# Patient Record
Sex: FEMALE | Race: WHITE | NOT HISPANIC OR LATINO
[De-identification: names, ages, dates, MRNs, and addresses within clinical notes are randomized per-mention and may not be internally consistent; named-entity substitution may affect disease eponyms.]

---

## 2018-09-24 PROBLEM — Z00.00 ENCOUNTER FOR PREVENTIVE HEALTH EXAMINATION: Status: ACTIVE | Noted: 2018-09-24

## 2018-10-25 ENCOUNTER — LABORATORY RESULT (OUTPATIENT)
Age: 48
End: 2018-10-25

## 2018-10-26 ENCOUNTER — APPOINTMENT (OUTPATIENT)
Dept: UROLOGY | Facility: CLINIC | Age: 48
End: 2018-10-26
Payer: COMMERCIAL

## 2018-10-26 ENCOUNTER — OUTPATIENT (OUTPATIENT)
Dept: OUTPATIENT SERVICES | Facility: HOSPITAL | Age: 48
LOS: 1 days | Discharge: HOME | End: 2018-10-26

## 2018-10-26 VITALS
DIASTOLIC BLOOD PRESSURE: 69 MMHG | SYSTOLIC BLOOD PRESSURE: 116 MMHG | BODY MASS INDEX: 41.68 KG/M2 | HEART RATE: 106 BPM | HEIGHT: 68 IN | WEIGHT: 275 LBS

## 2018-10-26 DIAGNOSIS — R35.0 FREQUENCY OF MICTURITION: ICD-10-CM

## 2018-10-26 DIAGNOSIS — R35.1 NOCTURIA: ICD-10-CM

## 2018-10-26 DIAGNOSIS — Z78.9 OTHER SPECIFIED HEALTH STATUS: ICD-10-CM

## 2018-10-26 DIAGNOSIS — N20.0 CALCULUS OF KIDNEY: ICD-10-CM

## 2018-10-26 PROCEDURE — 99204 OFFICE O/P NEW MOD 45 MIN: CPT

## 2018-10-29 LAB
APPEARANCE: ABNORMAL
BACTERIA UR CULT: NORMAL
BILIRUBIN URINE: NEGATIVE
BLOOD URINE: NEGATIVE
COLOR: YELLOW
GLUCOSE QUALITATIVE U: NEGATIVE MG/DL
KETONES URINE: NEGATIVE
LEUKOCYTE ESTERASE URINE: ABNORMAL
NITRITE URINE: NEGATIVE
PH URINE: 6.5
PROTEIN URINE: NEGATIVE MG/DL
SPECIFIC GRAVITY URINE: 1.02
UROBILINOGEN URINE: 0.2 MG/DL (ref 0.2–?)

## 2018-10-30 ENCOUNTER — OTHER (OUTPATIENT)
Age: 48
End: 2018-10-30

## 2018-11-01 ENCOUNTER — OUTPATIENT (OUTPATIENT)
Dept: OUTPATIENT SERVICES | Facility: HOSPITAL | Age: 48
LOS: 1 days | Discharge: HOME | End: 2018-11-01

## 2018-11-01 DIAGNOSIS — N20.0 CALCULUS OF KIDNEY: ICD-10-CM

## 2018-11-02 ENCOUNTER — APPOINTMENT (OUTPATIENT)
Dept: UROLOGY | Facility: CLINIC | Age: 48
End: 2018-11-02
Payer: COMMERCIAL

## 2018-11-02 ENCOUNTER — OUTPATIENT (OUTPATIENT)
Dept: OUTPATIENT SERVICES | Facility: HOSPITAL | Age: 48
LOS: 1 days | Discharge: HOME | End: 2018-11-02

## 2018-11-02 VITALS
HEART RATE: 103 BPM | WEIGHT: 275 LBS | SYSTOLIC BLOOD PRESSURE: 138 MMHG | BODY MASS INDEX: 41.68 KG/M2 | HEIGHT: 68 IN | DIASTOLIC BLOOD PRESSURE: 83 MMHG

## 2018-11-02 DIAGNOSIS — N20.0 CALCULUS OF KIDNEY: ICD-10-CM

## 2018-11-02 PROCEDURE — 99214 OFFICE O/P EST MOD 30 MIN: CPT

## 2018-11-05 LAB
APPEARANCE: CLEAR
BACTERIA UR CULT: NORMAL
BILIRUBIN URINE: NEGATIVE
BLOOD URINE: NEGATIVE
COLOR: YELLOW
GLUCOSE QUALITATIVE U: NEGATIVE MG/DL
KETONES URINE: NEGATIVE
LEUKOCYTE ESTERASE URINE: NEGATIVE
NITRITE URINE: NEGATIVE
PH URINE: 7
PROTEIN URINE: NEGATIVE MG/DL
SPECIFIC GRAVITY URINE: 1.01
UROBILINOGEN URINE: 0.2 MG/DL (ref 0.2–?)

## 2018-11-07 ENCOUNTER — OUTPATIENT (OUTPATIENT)
Dept: OUTPATIENT SERVICES | Facility: HOSPITAL | Age: 48
LOS: 1 days | Discharge: HOME | End: 2018-11-07

## 2018-11-07 ENCOUNTER — APPOINTMENT (OUTPATIENT)
Dept: UROLOGY | Facility: HOSPITAL | Age: 48
End: 2018-11-07
Payer: COMMERCIAL

## 2018-11-07 VITALS
OXYGEN SATURATION: 98 % | WEIGHT: 274.92 LBS | HEIGHT: 68 IN | TEMPERATURE: 98 F | HEART RATE: 80 BPM | DIASTOLIC BLOOD PRESSURE: 58 MMHG | SYSTOLIC BLOOD PRESSURE: 121 MMHG | RESPIRATION RATE: 18 BRPM

## 2018-11-07 VITALS — RESPIRATION RATE: 18 BRPM | SYSTOLIC BLOOD PRESSURE: 126 MMHG | HEART RATE: 85 BPM | DIASTOLIC BLOOD PRESSURE: 66 MMHG

## 2018-11-07 DIAGNOSIS — Z90.49 ACQUIRED ABSENCE OF OTHER SPECIFIED PARTS OF DIGESTIVE TRACT: Chronic | ICD-10-CM

## 2018-11-07 DIAGNOSIS — Z87.442 PERSONAL HISTORY OF URINARY CALCULI: Chronic | ICD-10-CM

## 2018-11-07 PROCEDURE — 52353 CYSTOURETERO W/LITHOTRIPSY: CPT | Mod: LT

## 2018-11-07 RX ORDER — PHENAZOPYRIDINE HCL 100 MG
200 TABLET ORAL ONCE
Qty: 0 | Refills: 0 | Status: COMPLETED | OUTPATIENT
Start: 2018-11-07 | End: 2018-11-07

## 2018-11-07 RX ORDER — ONDANSETRON 8 MG/1
4 TABLET, FILM COATED ORAL ONCE
Qty: 0 | Refills: 0 | Status: DISCONTINUED | OUTPATIENT
Start: 2018-11-07 | End: 2018-11-22

## 2018-11-07 RX ORDER — MORPHINE SULFATE 50 MG/1
2 CAPSULE, EXTENDED RELEASE ORAL
Qty: 0 | Refills: 0 | Status: DISCONTINUED | OUTPATIENT
Start: 2018-11-07 | End: 2018-11-07

## 2018-11-07 RX ORDER — OXYCODONE AND ACETAMINOPHEN 5; 325 MG/1; MG/1
1 TABLET ORAL ONCE
Qty: 0 | Refills: 0 | Status: DISCONTINUED | OUTPATIENT
Start: 2018-11-07 | End: 2018-11-07

## 2018-11-07 RX ORDER — SODIUM CHLORIDE 9 MG/ML
1000 INJECTION, SOLUTION INTRAVENOUS
Qty: 0 | Refills: 0 | Status: DISCONTINUED | OUTPATIENT
Start: 2018-11-07 | End: 2018-11-22

## 2018-11-07 RX ORDER — OXYCODONE 5 MG/1
5 TABLET ORAL
Qty: 10 | Refills: 0 | Status: ACTIVE | COMMUNITY
Start: 2018-11-07 | End: 1900-01-01

## 2018-11-07 RX ORDER — HYDROMORPHONE HYDROCHLORIDE 2 MG/ML
0.5 INJECTION INTRAMUSCULAR; INTRAVENOUS; SUBCUTANEOUS
Qty: 0 | Refills: 0 | Status: DISCONTINUED | OUTPATIENT
Start: 2018-11-07 | End: 2018-11-07

## 2018-11-07 RX ADMIN — SODIUM CHLORIDE 100 MILLILITER(S): 9 INJECTION, SOLUTION INTRAVENOUS at 16:13

## 2018-11-07 RX ADMIN — Medication 200 MILLIGRAM(S): at 16:28

## 2018-11-07 NOTE — BRIEF OPERATIVE NOTE - OPERATION/FINDINGS
left impacted distal ureteral stone successfully fragmented w laser and stones extracted sent for stone analysis. f/u in 5 days for stent removal

## 2018-11-07 NOTE — ASU DISCHARGE PLAN (ADULT/PEDIATRIC). - SPECIAL INSTRUCTIONS
You had a ureteral stent placed in your ureter to help keep the ureter open post operatively. It is attached to a string which is taped to your thigh. Please do not remove it and be cautious when wiping. We will prescribe flomax electronically to your pharmacy which you should take daily for stent discomfort. You can take ibuprofen (advil/motrin) and add tylenol as needed for pain control.   Please complete the antibiotic course which was also prescribed to your pharmacy. Save one antibiotic for the day of stent removal.  's office will call you for a follow up appointment. You had a ureteral stent placed in your ureter to help keep the ureter open post operatively.  We will prescribe flomax electronically to your pharmacy which you should take daily for stent discomfort. You can take ibuprofen (advil/motrin) and add tylenol as needed for pain control.   Please complete the antibiotic course which was also prescribed to your pharmacy. Save one antibiotic for the day of stent removal.  's office will call you for a follow up appointment.

## 2018-11-07 NOTE — PACU DISCHARGE NOTE - NS MD DISCHARGE NOTE DISCHARGE
History  Chief Complaint   Patient presents with    Syncope     pt presents post syncopal episode before 10 pm, "i slamed the jeeep door and i blacked out" - thinners, laceration ot forehead     Head Injury     44-year-old female presents here with a chief complaint of accidentally having slammed the trunk door into her face  Point of impact was over the bridge of the nose where there is a punctate laceration that will not need repair  She did have a positive loss of consciousness less than a minute  Currently she is symptom free aside from pain over the bridge of her nose  Additionally, she states that she has a current dental abscess over the left lower jaw and is requesting antibiotic therapy for this            Prior to Admission Medications   Prescriptions Last Dose Informant Patient Reported? Taking? ALPRAZolam (XANAX) 0 5 mg tablet   Yes No   Sig: Take 0 5 mg by mouth as needed for anxiety   pregabalin (LYRICA) 75 mg capsule   No No   Sig: Take 1 capsule by mouth 2 (two) times a day for 10 days   valACYclovir (VALTREX) 1,000 mg tablet   No No   Sig: Take 1 tablet by mouth every 8 (eight) hours for 5 days      Facility-Administered Medications: None       Past Medical History:   Diagnosis Date    Atrial septal defect     Mitral valve prolapse        Past Surgical History:   Procedure Laterality Date    APPENDECTOMY       SECTION      CHOLECYSTECTOMY      HYSTERECTOMY      SIGMOID RESECTION / RECTOPEXY         Family History   Problem Relation Age of Onset    Adopted: Yes    No Known Problems Mother     No Known Problems Father      I have reviewed and agree with the history as documented  Social History   Substance Use Topics    Smoking status: Current Every Day Smoker     Packs/day: 0 50     Types: Cigarettes    Smokeless tobacco: Never Used    Alcohol use No        Review of Systems   Constitutional: Negative for activity change, fatigue and fever     HENT: Negative for Home congestion, ear pain, rhinorrhea and sore throat  Eyes: Negative  Respiratory: Negative for cough, shortness of breath and wheezing  Gastrointestinal: Negative for abdominal pain, diarrhea, nausea and vomiting  Endocrine: Negative  Genitourinary: Negative for difficulty urinating, dyspareunia, dysuria, flank pain, frequency, menstrual problem, pelvic pain, urgency, vaginal bleeding, vaginal discharge and vaginal pain  Musculoskeletal: Negative for arthralgias and myalgias  Skin: Positive for wound  Negative for color change and pallor  Neurological: Negative for dizziness, speech difficulty, weakness and headaches  Hematological: Negative for adenopathy  Psychiatric/Behavioral: Negative for confusion  Physical Exam  ED Triage Vitals   Temperature Pulse Respirations Blood Pressure SpO2   05/07/18 2250 05/07/18 2247 05/07/18 2247 05/07/18 2247 05/07/18 2247   99 3 °F (37 4 °C) 96 18 149/67 99 %      Temp Source Heart Rate Source Patient Position - Orthostatic VS BP Location FiO2 (%)   05/07/18 2250 05/07/18 2247 05/07/18 2247 05/07/18 2247 --   Oral Monitor Sitting Right arm       Pain Score       05/07/18 2247       6           Orthostatic Vital Signs  Vitals:    05/07/18 2247 05/08/18 0030   BP: 149/67 104/70   Pulse: 96 89   Patient Position - Orthostatic VS: Sitting Sitting       Physical Exam   Constitutional: She is oriented to person, place, and time  She appears well-developed and well-nourished  She is cooperative  Non-toxic appearance  She does not have a sickly appearance  She does not appear ill  No distress  HENT:   Head: Normocephalic and atraumatic  Right Ear: Tympanic membrane and external ear normal    Left Ear: Tympanic membrane and external ear normal    Nose: No rhinorrhea or sinus tenderness  No epistaxis  Right sinus exhibits no maxillary sinus tenderness and no frontal sinus tenderness   Left sinus exhibits no maxillary sinus tenderness and no frontal sinus tenderness  Mouth/Throat: Oropharynx is clear and moist and mucous membranes are normal  Normal dentition  Eyes: EOM are normal  Pupils are equal, round, and reactive to light  Neck: Normal range of motion  Neck supple  Cardiovascular: Normal rate, regular rhythm and normal heart sounds  No murmur heard  Pulmonary/Chest: Effort normal and breath sounds normal  No accessory muscle usage  No respiratory distress  She has no wheezes  She has no rales  She exhibits no tenderness  Abdominal: Soft  She exhibits no distension  There is no guarding  Musculoskeletal: Normal range of motion  She exhibits no edema or tenderness  Lymphadenopathy:     She has no cervical adenopathy  Neurological: She is alert and oriented to person, place, and time  She exhibits normal muscle tone  Skin: Skin is warm and dry  No rash noted  No erythema  Less than 1 cm punctate laceration over the bridge of the nose  Psychiatric: She has a normal mood and affect  Nursing note and vitals reviewed  ED Medications  Medications   HYDROcodone-acetaminophen (NORCO) 5-325 mg per tablet 1 tablet (1 tablet Oral Given 5/7/18 4047)   bacitracin topical ointment 1 large application (1 large application Topical Given 5/7/18 3731)       Diagnostic Studies  Results Reviewed     None                 CT cervical spine without contrast   Final Result by Chhaya Dash MD (05/08 7142)      No cervical spine fracture or traumatic malalignment  There is reversal of normal cervical lordosis  This may simply be due to prominent flexion  Grade 1 retrolisthesis C4 on C5  Mild multilevel cervical degenerative disease  Severe neuroforaminal narrowing on the right at C5-6 and to a lesser extent bilaterally at C4-5 right greater than left        Findings are consistent with the preliminary report from [facility] which was provided shortly after completion of the exam                          Workstation performed: [id number]         CT head without contrast   Final Result by Leilani Zamora MD (05/08 0006)      No acute intracranial abnormality  Workstation performed: [id number]                    Procedures  Procedures       Phone Contacts  ED Phone Contact    ED Course                               MDM  Number of Diagnoses or Management Options  Closed head injury, initial encounter: new and requires workup  Concussion with loss of consciousness of 30 minutes or less, initial encounter: new and requires workup  Dental abscess: new and requires workup     Amount and/or Complexity of Data Reviewed  Tests in the radiology section of CPT®: reviewed and ordered  Independent visualization of images, tracings, or specimens: yes    Patient Progress  Patient progress: stable    CritCare Time    Disposition  Final diagnoses:   Closed head injury, initial encounter   Concussion with loss of consciousness of 30 minutes or less, initial encounter   Dental abscess     Time reflects when diagnosis was documented in both MDM as applicable and the Disposition within this note     Time User Action Codes Description Comment    5/8/2018 12:17 AM Rikki Vacaan Add [S09 90XA] Closed head injury, initial encounter     5/8/2018 12:18 AM Brandt Chung Add [S06 0X1A] Concussion with loss of consciousness of 30 minutes or less, initial encounter     5/8/2018 12:18 AM Rikki Vacaan Add [K04 7] Dental abscess       ED Disposition     ED Disposition Condition Comment    Discharge  [address] [place] [address] discharge to home/self care      Condition at discharge: Good        Follow-up Information     Follow up With Specialties Details Why Contact Info Additional Information    [address] [facility] Emergency Department Emergency Medicine  As needed [address] [id number] ED, [address] [place], [place], [facility]        Discharge Medication List as of 5/8/2018 12:19 AM START taking these medications    Details   clindamycin (CLEOCIN) 300 MG capsule Take 1 capsule (300 mg total) by mouth 4 (four) times a day for 10 days, Starting Tue 5/8/2018, Until Fri 5/18/2018, Print      HYDROcodone-acetaminophen (NORCO) 5-325 mg per tablet Take 1 tablet by mouth every 6 (six) hours as needed (Not relieved by Anti-inflammatory) for up to 12 doses Max Daily Amount: 4 tablets, Starting Tue 5/8/2018, Print         CONTINUE these medications which have NOT CHANGED    Details   ALPRAZolam (XANAX) 0 5 mg tablet Take 0 5 mg by mouth as needed for anxiety, Until Discontinued, Historical Med      pregabalin (LYRICA) 75 mg capsule Take 1 capsule by mouth 2 (two) times a day for 10 days, Starting 10/20/2016, Until Sun 10/30/16, Print      valACYclovir (VALTREX) 1,000 mg tablet Take 1 tablet by mouth every 8 (eight) hours for 5 days, Starting 10/20/2016, Until Tue 10/25/16, Print           No discharge procedures on file      ED Provider  Electronically Signed by           Wendie Larsen [place]  05/09/18 1683

## 2018-11-07 NOTE — BRIEF OPERATIVE NOTE - PROCEDURE
<<-----Click on this checkbox to enter Procedure Ureteroscopy of left ureter with lithotripsy  11/07/2018  left ureteral stent insertion  [facility]

## 2018-11-09 LAB — COMPN STONE: SIGNIFICANT CHANGE UP

## 2018-11-12 DIAGNOSIS — N20.1 CALCULUS OF URETER: ICD-10-CM

## 2018-11-13 ENCOUNTER — APPOINTMENT (OUTPATIENT)
Dept: UROLOGY | Facility: CLINIC | Age: 48
End: 2018-11-13
Payer: COMMERCIAL

## 2018-11-13 PROBLEM — Z86.39 PERSONAL HISTORY OF OTHER ENDOCRINE, NUTRITIONAL AND METABOLIC DISEASE: Chronic | Status: ACTIVE | Noted: 2018-11-07

## 2018-11-13 PROBLEM — Z86.79 PERSONAL HISTORY OF OTHER DISEASES OF THE CIRCULATORY SYSTEM: Chronic | Status: ACTIVE | Noted: 2018-11-07

## 2018-11-13 PROCEDURE — 52000 CYSTOURETHROSCOPY: CPT

## 2019-02-08 ENCOUNTER — APPOINTMENT (OUTPATIENT)
Dept: UROLOGY | Facility: CLINIC | Age: 49
End: 2019-02-08
Payer: COMMERCIAL

## 2019-02-08 VITALS — HEIGHT: 68 IN | WEIGHT: 275 LBS | BODY MASS INDEX: 41.68 KG/M2

## 2019-02-08 PROCEDURE — 99214 OFFICE O/P EST MOD 30 MIN: CPT

## 2019-02-08 RX ORDER — ALLOPURINOL 100 MG/1
100 TABLET ORAL DAILY
Qty: 60 | Refills: 3 | Status: ACTIVE | COMMUNITY
Start: 2019-02-08 | End: 1900-01-01

## 2019-02-08 NOTE — ASSESSMENT
[FreeTextEntry1] : MAHENDRA HERNANDEZ is a 49 year old female who presents Status post ureteroscopy and laser lithotripsy of an obstructing distal ureteral stone November 7, 2018 did well after surgery and her stent was removed. She has a long history of nephrolithiasis and presents today after 24-hour metabolic workup.\par Ltholink reveals extreme hypercalciuria, severe hyperuricosuria and a low urine pH is 5.41.\par \par Will prescribe allopurinol As she does not he eat animal protein. I recommended the patient stop w her multivitamin tablets, vitamin D and calcium supplementation. As well as vitamin C.\par Hopefully this will improve her hypercalciuria and now we will need to initiate thiazides treatment.In the future reconsider calcium citrate as a supplementation\par \par Followup next visit with a 24-hour metabolic workup as well as serum parathyroid and uric acid level and a KUB\par

## 2019-02-08 NOTE — HISTORY OF PRESENT ILLNESS
[FreeTextEntry1] : MAHENDRA HERNANDEZ is a 49 year old female who presents Status post ureteroscopy and laser lithotripsy of an obstructing distal ureteral stone November 7, 2018 did well after surgery and her stent was removed. She has a long history of nephrolithiasis and presents today after 24-hour metabolic workup.\par Ltholink reveals extreme hypercalciuria, severe hyperuricosuria and a low urine pH is 5.41.\par Patient's prior stone analysis is predominantly calcium oxylate.\par Records reviewed and prior labs show high normal calcium with normal liver function tests.\par She currently denies any flank pain.\par Denies gross hematuria, dysuria or associated symptoms. \par  [Currently Experiencing ___] :  [unfilled] [1] : 1 [Lower Back] : lower back [None] : There is no radiation [Sharp] : sharp [Sudden] : sudden [Improving] : improving [de-identified] : Inspiration/movement [de-identified] : Rest

## 2019-06-06 ENCOUNTER — APPOINTMENT (OUTPATIENT)
Dept: UROLOGY | Facility: CLINIC | Age: 49
End: 2019-06-06
Payer: COMMERCIAL

## 2019-06-06 VITALS
WEIGHT: 275 LBS | BODY MASS INDEX: 41.68 KG/M2 | HEART RATE: 90 BPM | SYSTOLIC BLOOD PRESSURE: 139 MMHG | DIASTOLIC BLOOD PRESSURE: 95 MMHG | HEIGHT: 68 IN

## 2019-06-06 PROCEDURE — 99214 OFFICE O/P EST MOD 30 MIN: CPT

## 2019-06-06 RX ORDER — CHLORTHALIDONE 25 MG/1
25 TABLET ORAL DAILY
Qty: 90 | Refills: 3 | Status: ACTIVE | COMMUNITY
Start: 2019-06-06 | End: 1900-01-01

## 2019-06-06 NOTE — PHYSICAL EXAM
[General Appearance - Well Developed] : well developed [General Appearance - Well Nourished] : well nourished [General Appearance - In No Acute Distress] : no acute distress [Well Groomed] : well groomed [Normal Appearance] : normal appearance [Abdomen Tenderness] : non-tender [Abdomen Soft] : soft [Costovertebral Angle Tenderness] : no ~M costovertebral angle tenderness [Edema] : no peripheral edema [Respiration, Rhythm And Depth] : normal respiratory rhythm and effort [Exaggerated Use Of Accessory Muscles For Inspiration] : no accessory muscle use [] : no respiratory distress [Affect] : the affect was normal [Oriented To Time, Place, And Person] : oriented to person, place, and time [Not Anxious] : not anxious [Mood] : the mood was normal [Normal Station and Gait] : the gait and station were normal for the patient's age

## 2019-06-06 NOTE — ASSESSMENT
[FreeTextEntry1] : MAHENDRA HERNANDEZ is a 49 year old female who presents Status post ureteroscopy and laser lithotripsy of an obstructing distal ureteral stone November 7, 2018 did well after surgery and her stent was removed. She has a long history of nephrolithiasis and presents today after repeat 24-hour metabolic workup.\par \par \par Patient's prior stone analysis is predominantly calcium oxylate.Repeat 24 metabolic workup demonstrates significant hypercalciuria, improved but still elevated hyperuricosuria, low urine volume, increased sodium.\par We will be prescribing chlorthalidone as dietary changes have not improved calcium. The recommending decreasing salt intake. Decreasing a low protein and increasing productive vegetables. Recommend increasing to 3 L of fluid per day.\par Follow up in the future with renal bladder ultrasound and KUB.\par We discussed side effects of hctz meds, electrolyte serum abrnormalities. fu w BMP\par If  future hyperuricosuria can add allopurinol.\par

## 2019-06-06 NOTE — HISTORY OF PRESENT ILLNESS
[FreeTextEntry1] : MAHENDRA HERNANDEZ is a 49 year old female who presents Status post ureteroscopy and laser lithotripsy of an obstructing distal ureteral stone November 7, 2018 did well after surgery and her stent was removed. She has a long history of nephrolithiasis and presents today after repeat 24-hour metabolic workup.\par \par Patient's prior stone analysis is predominantly calcium oxylate.A repeat 24-hour metabolic workup shows low urine volume. Her urine calcium remains significantly elevated despite stopping her supplements. Urine pH remains low.\par Urinary gas excretion has fallen however is moderately elevated .\par Urine citrate is normal. High dietary sodium.\par Her serum uric acid, serum calcium and serum parathyroid and normal limits. Pt did not start allopurinol.\par \par Prior Ltholink reveals extreme hypercalciuria, severe hyperuricosuria and a low urine pH is 5.41.\par Records reviewed and prior labs show high normal calcium with normal liver function tests.\par She currently denies any flank pain.\par Denies gross hematuria, dysuria or associated symptoms. \par  [Currently Experiencing ___] :  [unfilled] [1] : 1 [Lower Back] : lower back [None] : There is no radiation [Sharp] : sharp [Sudden] : sudden [Improving] : improving [de-identified] : Inspiration/movement [de-identified] : Rest

## 2019-11-18 ENCOUNTER — APPOINTMENT (OUTPATIENT)
Dept: UROLOGY | Facility: CLINIC | Age: 49
End: 2019-11-18
Payer: COMMERCIAL

## 2019-11-18 PROCEDURE — 99214 OFFICE O/P EST MOD 30 MIN: CPT

## 2019-11-18 NOTE — HISTORY OF PRESENT ILLNESS
[FreeTextEntry1] : MAHENDRA HERNANDEZ is a 49 year old female who presents Status post ureteroscopy and laser lithotripsy of an obstructing distal ureteral stone November 7, 2018 did well after surgery and her stent was removed. She has a long history of nephrolithiasis and presents today after repeat 24-hour metabolic workup. and kub/sono.\par \par KUB neg for stones\par Sono-\par Left kidney 13 cm and there are 2 echogenic foci in the left mid kidney measuring 3 mm and 5 mm. Possibly these are small stones. These do not shadow. \par \par Litholink 10/2019 (third) demonstrates hypercalciuria again, dec urine vol borderline 1.75 and inc Na in urine. Similar to previous. Borderline hyperuricosuria. She started chlorthalidone last visit he reports she developed significant cramping of her LE. this med was stopped. \par \par Prev-\par Patient's prior stone analysis is predominantly calcium oxylate.\par Her serum uric acid, serum calcium and serum parathyroid and normal limits.\par \par Prior Ltholink reveals extreme hypercalciuria, severe hyperuricosuria and a low urine pH is 5.41.\par Records reviewed and prior labs show high normal calcium with normal liver function tests.\par She currently denies any flank pain.\par Denies gross hematuria, dysuria or associated symptoms. \par  [Currently Experiencing ___] :  [unfilled] [1] : 1 [Lower Back] : lower back [Sharp] : sharp [Sudden] : sudden [None] : There is no radiation [de-identified] : Inspiration/movement [de-identified] : Rest [Improving] : improving

## 2019-11-18 NOTE — ASSESSMENT
[FreeTextEntry1] : MAHENDRA HERNANDEZ is a 49 year old female who presents Status post ureteroscopy and laser lithotripsy of an obstructing distal ureteral stone November 7, 2018 did well after surgery and her stent was removed. She has a long history of nephrolithiasis and presents today after repeat 24-hour metabolic workup. and kub/sono.\par No stones on KUB\par \par Litholink 10/2019 (third) demonstrates hypercalciuria again, dec urine vol borderline 1.75 and inc Na in urine. Similar to previous. Borderline hyperuricosuria. \par \par Recommending decreasing salt intake. Recommend increasing to 3 L of fluid per day.\par Fu w renal re alternative to chlorthalidone\par BMP

## 2019-11-18 NOTE — PHYSICAL EXAM
[General Appearance - Well Developed] : well developed [General Appearance - Well Nourished] : well nourished [Well Groomed] : well groomed [Normal Appearance] : normal appearance [Abdomen Soft] : soft [Abdomen Tenderness] : non-tender [General Appearance - In No Acute Distress] : no acute distress [Costovertebral Angle Tenderness] : no ~M costovertebral angle tenderness [Edema] : no peripheral edema [Respiration, Rhythm And Depth] : normal respiratory rhythm and effort [] : no respiratory distress [Exaggerated Use Of Accessory Muscles For Inspiration] : no accessory muscle use [Oriented To Time, Place, And Person] : oriented to person, place, and time [Affect] : the affect was normal [Not Anxious] : not anxious [Mood] : the mood was normal [Normal Station and Gait] : the gait and station were normal for the patient's age

## 2020-10-14 ENCOUNTER — OUTPATIENT (OUTPATIENT)
Dept: OUTPATIENT SERVICES | Facility: HOSPITAL | Age: 50
LOS: 1 days | Discharge: HOME | End: 2020-10-14
Payer: COMMERCIAL

## 2020-10-14 DIAGNOSIS — Z87.442 PERSONAL HISTORY OF URINARY CALCULI: Chronic | ICD-10-CM

## 2020-10-14 DIAGNOSIS — Z90.49 ACQUIRED ABSENCE OF OTHER SPECIFIED PARTS OF DIGESTIVE TRACT: Chronic | ICD-10-CM

## 2020-10-14 DIAGNOSIS — R82.993 HYPERURICOSURIA: ICD-10-CM

## 2020-10-14 DIAGNOSIS — N20.0 CALCULUS OF KIDNEY: ICD-10-CM

## 2020-10-14 PROCEDURE — 74019 RADEX ABDOMEN 2 VIEWS: CPT | Mod: 26

## 2020-11-05 ENCOUNTER — APPOINTMENT (OUTPATIENT)
Dept: UROLOGY | Facility: CLINIC | Age: 50
End: 2020-11-05
Payer: COMMERCIAL

## 2020-11-05 VITALS — WEIGHT: 280 LBS | HEIGHT: 67 IN | TEMPERATURE: 96.4 F | BODY MASS INDEX: 43.95 KG/M2

## 2020-11-05 DIAGNOSIS — R82.993 HYPERURICOSURIA: ICD-10-CM

## 2020-11-05 PROCEDURE — 99214 OFFICE O/P EST MOD 30 MIN: CPT

## 2020-11-05 PROCEDURE — 99072 ADDL SUPL MATRL&STAF TM PHE: CPT

## 2020-11-05 NOTE — ASSESSMENT
[FreeTextEntry1] : MAHENDRA HERNANDEZ is a 49 year old female who presents Status post ureteroscopy and laser lithotripsy of an obstructing distal ureteral stone November 7, 2018 did well after surgery and her stent was removed. \par \par Now stone free with dietary changes, increased hydration, eliminating supplements. decreased salt.\par \par fu prn

## 2020-11-05 NOTE — HISTORY OF PRESENT ILLNESS
[FreeTextEntry1] : MAHENDRA HERNANDEZ is a 49 year old female who presents Status post ureteroscopy and laser lithotripsy of an obstructing distal ureteral stone November 7, 2018 did well after surgery and her stent was removed. \par \par Patient is doing well denies any flank pain hematuria or dysuria.\par KUB images visualized from October 2020 and there are no stones identified.\par Calcium 9.1\par Cr 0.61\par \par Previously\par Sono-\par Left kidney 13 cm and there are 2 echogenic foci in the left mid kidney measuring 3 mm and 5 mm. Possibly these are small stones. These do not shadow. \par \par Litholink 10/2019 (third) demonstrates hypercalciuria again, dec urine vol borderline 1.75 and inc Na in urine. Similar to previous. Borderline hyperuricosuria. She started chlorthalidone last visit he reports she developed significant cramping of her LE. this med was stopped. \par \par Patient's prior stone analysis is predominantly calcium oxylate.\par Her serum uric acid, serum calcium and serum parathyroid and normal limits.\par \par Records reviewed and prior labs show high normal calcium with normal liver function tests.\par She currently denies any flank pain.\par Denies gross hematuria, dysuria or associated symptoms. \par  [None] : None [Lower Back] : lower back [de-identified] : Inspiration/movement [de-identified] : Rest

## 2021-01-11 ENCOUNTER — APPOINTMENT (OUTPATIENT)
Dept: ENDOCRINOLOGY | Facility: CLINIC | Age: 51
End: 2021-01-11
Payer: COMMERCIAL

## 2021-01-11 VITALS
BODY MASS INDEX: 43.5 KG/M2 | HEIGHT: 68 IN | OXYGEN SATURATION: 98 % | HEART RATE: 84 BPM | TEMPERATURE: 97.2 F | SYSTOLIC BLOOD PRESSURE: 130 MMHG | WEIGHT: 287 LBS | DIASTOLIC BLOOD PRESSURE: 80 MMHG

## 2021-01-11 PROCEDURE — 99214 OFFICE O/P EST MOD 30 MIN: CPT

## 2021-01-11 PROCEDURE — 99072 ADDL SUPL MATRL&STAF TM PHE: CPT

## 2021-01-11 NOTE — ASSESSMENT
[FreeTextEntry1] : The patient has a history of hypothyroidism though currently is clinically euthyroid with no hypothyroid or hyperthyroid signs or symptoms. The patient's TSH (0.73), free T4 and free T3 are in the normal range.Risks of subclinical hyperthyroidism (BMD,cardio etc.) and hypothyroidism ( fatigue , muscle aches, weight gain etc.)  discussed in detail and given clinical euthyroid state after discussion pt. is comfortable with current dose.\par Pt. has multinodular goiter. We have reviewed ultrasounds and the discussed the potential for hyperfunction and need to monitor for changes that could be consistent with neoplasm. Will continue to monitor U/S for stability and function.\par Pt. has Impaired fasting glucose  (124)and  current HbA1c is 6.2. We have discussed diet/exercise and risks related to diabetes in great detail.We will attempt to diet and exercise and decide on Metformin next visit. \par Lipid levels were reviewed with patient and importance and function of LDL, HDL and triglycerides discussed. Methods to increase HDL (exercise, fish, beans, oat meal, legumes etc.) discussed with pt. in conjunction with measures to decrease LDL and triglycerides  including diet and exercise.LDL/HDL was 172/50. Current regimen of treatment to continue. Risks of statins were discussed in detail. Statin suggested but wishes to hold off so will start Vascepa.\par

## 2021-01-11 NOTE — PHYSICAL EXAM
[Alert] : alert [Well Nourished] : well nourished [No Acute Distress] : no acute distress [Well Developed] : well developed [Normal Sclera/Conjunctiva] : normal sclera/conjunctiva [EOMI] : extra ocular movement intact [No Proptosis] : no proptosis [Normal Oropharynx] : the oropharynx was normal [Thyroid Not Enlarged] : the thyroid was not enlarged [No Respiratory Distress] : no respiratory distress [No Thyroid Nodules] : no palpable thyroid nodules [No Accessory Muscle Use] : no accessory muscle use [Clear to Auscultation] : lungs were clear to auscultation bilaterally [Normal S1, S2] : normal S1 and S2 [Normal Rate] : heart rate was normal [Regular Rhythm] : with a regular rhythm [No Edema] : no peripheral edema [Pedal Pulses Normal] : the pedal pulses are present [Normal Bowel Sounds] : normal bowel sounds [Not Tender] : non-tender [Not Distended] : not distended [Soft] : abdomen soft [Normal Anterior Cervical Nodes] : no anterior cervical lymphadenopathy [Normal Posterior Cervical Nodes] : no posterior cervical lymphadenopathy [No Spinal Tenderness] : no spinal tenderness [Spine Straight] : spine straight [No Stigmata of Cushings Syndrome] : no stigmata of Cushings Syndrome [Normal Gait] : normal gait [Normal Strength/Tone] : muscle strength and tone were normal [No Rash] : no rash [Normal Reflexes] : deep tendon reflexes were 2+ and symmetric [Oriented x3] : oriented to person, place, and time [No Tremors] : no tremors [Acanthosis Nigricans] : no acanthosis nigricans

## 2021-02-23 ENCOUNTER — TRANSCRIPTION ENCOUNTER (OUTPATIENT)
Age: 51
End: 2021-02-23

## 2021-03-03 ENCOUNTER — APPOINTMENT (OUTPATIENT)
Dept: SURGERY | Facility: CLINIC | Age: 51
End: 2021-03-03
Payer: COMMERCIAL

## 2021-03-03 ENCOUNTER — NON-APPOINTMENT (OUTPATIENT)
Age: 51
End: 2021-03-03

## 2021-03-03 VITALS
HEIGHT: 68 IN | SYSTOLIC BLOOD PRESSURE: 138 MMHG | WEIGHT: 290 LBS | TEMPERATURE: 98 F | HEART RATE: 103 BPM | BODY MASS INDEX: 43.95 KG/M2 | DIASTOLIC BLOOD PRESSURE: 84 MMHG

## 2021-03-03 DIAGNOSIS — C20 MALIGNANT NEOPLASM OF RECTUM: ICD-10-CM

## 2021-03-03 PROCEDURE — 99072 ADDL SUPL MATRL&STAF TM PHE: CPT

## 2021-03-03 PROCEDURE — 99205 OFFICE O/P NEW HI 60 MIN: CPT

## 2021-03-08 ENCOUNTER — LABORATORY RESULT (OUTPATIENT)
Age: 51
End: 2021-03-08

## 2021-03-08 ENCOUNTER — OUTPATIENT (OUTPATIENT)
Dept: OUTPATIENT SERVICES | Facility: HOSPITAL | Age: 51
LOS: 1 days | Discharge: HOME | End: 2021-03-08

## 2021-03-08 DIAGNOSIS — Z90.49 ACQUIRED ABSENCE OF OTHER SPECIFIED PARTS OF DIGESTIVE TRACT: Chronic | ICD-10-CM

## 2021-03-08 DIAGNOSIS — Z87.442 PERSONAL HISTORY OF URINARY CALCULI: Chronic | ICD-10-CM

## 2021-03-08 DIAGNOSIS — Z11.59 ENCOUNTER FOR SCREENING FOR OTHER VIRAL DISEASES: ICD-10-CM

## 2021-03-09 NOTE — ASSESSMENT
[FreeTextEntry1] : 51F with rectal mass concerning for malignancy\par \par I had a long discussion with the patient regarding the possibility of a cancer diagnosis. I recommended that we follow up on the pathology report regarding the polyps as well as the mass.  We will also proceed with clinical staging.  We will schedule the patient for CT chest abdomen and pelvis, MRI pelvis and obtain lab work including a CEA level.  Patient will use an enema prior to her next visit so that we can perform rigid proctoscopy to determine the exact distance to the anal verge.  We will have the patient return in 1-2 weeks to discuss the results and determine a treatment plan.\par

## 2021-03-09 NOTE — CONSULT LETTER
[Dear  ___] : Dear  [unfilled], [Consult Letter:] : I had the pleasure of evaluating your patient, [unfilled]. [Please see my note below.] : Please see my note below. [Consult Closing:] : Thank you very much for allowing me to participate in the care of this patient.  If you have any questions, please do not hesitate to contact me. [FreeTextEntry3] : Sincerely,\par \par Luis Rothman MD, Colon and Rectal Surgery\par \par [employer] [facility]\par [address] [place] [address]\par [facility], 3rd Floor\par [place], [place] [address]\par Tel [phone]\par Fax [phone]\par

## 2021-03-09 NOTE — PHYSICAL EXAM
[No HSM] : no hepatosplenomegaly [Respiratory Effort] : normal respiratory effort [Normal Rate and Rhythm] : normal rate and rhythm [Abdomen Masses] : No abdominal masses [Abdomen Tenderness] : ~T No ~M abdominal tenderness [de-identified] : awake, alert and in no acute distress

## 2021-03-09 NOTE — HISTORY OF PRESENT ILLNESS
[FreeTextEntry1] : Patient is a 51F with PMH of HTN and hypothyroidism who present for evaluation of a rectal mass. Patient states she was having bleeding per rectum and underwent colonoscopy with Dr. Natarajan on 3/1/21.  This showed a rectal mass concerning for malignancy, a sigmoid polyp and diverticulosis. Biopsy results are pending.  Patient was referred to our office for evaluation.  Patient states she feels well.  Patient denies fevers, chills, nausea, vomiting, abdominal pain, constipation, diarrhea or unexpected weight loss.  Patient denies a family history of colon cancer rectal cancer or inflammatory bowel disease.  Patient has never had a colonoscopy.\par

## 2021-03-11 ENCOUNTER — TRANSCRIPTION ENCOUNTER (OUTPATIENT)
Age: 51
End: 2021-03-11

## 2021-03-11 ENCOUNTER — OUTPATIENT (OUTPATIENT)
Dept: OUTPATIENT SERVICES | Facility: HOSPITAL | Age: 51
LOS: 1 days | Discharge: HOME | End: 2021-03-11
Payer: COMMERCIAL

## 2021-03-11 ENCOUNTER — RESULT REVIEW (OUTPATIENT)
Age: 51
End: 2021-03-11

## 2021-03-11 VITALS
RESPIRATION RATE: 20 BRPM | HEART RATE: 90 BPM | WEIGHT: 279.99 LBS | TEMPERATURE: 99 F | SYSTOLIC BLOOD PRESSURE: 130 MMHG | DIASTOLIC BLOOD PRESSURE: 75 MMHG | HEIGHT: 68 IN

## 2021-03-11 VITALS
HEART RATE: 87 BPM | DIASTOLIC BLOOD PRESSURE: 64 MMHG | SYSTOLIC BLOOD PRESSURE: 118 MMHG | OXYGEN SATURATION: 98 % | RESPIRATION RATE: 18 BRPM

## 2021-03-11 DIAGNOSIS — Z87.442 PERSONAL HISTORY OF URINARY CALCULI: Chronic | ICD-10-CM

## 2021-03-11 DIAGNOSIS — Z90.49 ACQUIRED ABSENCE OF OTHER SPECIFIED PARTS OF DIGESTIVE TRACT: Chronic | ICD-10-CM

## 2021-03-11 PROCEDURE — 45331 SIGMOIDOSCOPY AND BIOPSY: CPT | Mod: XU

## 2021-03-11 PROCEDURE — 88305 TISSUE EXAM BY PATHOLOGIST: CPT | Mod: 26

## 2021-03-11 PROCEDURE — 88341 IMHCHEM/IMCYTCHM EA ADD ANTB: CPT | Mod: 26

## 2021-03-11 PROCEDURE — 45335 SIGMOIDOSCOPY W/SUBMUC INJ: CPT | Mod: XU

## 2021-03-11 PROCEDURE — 45341 SIGMOIDOSCOPY W/ULTRASOUND: CPT

## 2021-03-11 PROCEDURE — 88342 IMHCHEM/IMCYTCHM 1ST ANTB: CPT | Mod: 26

## 2021-03-11 RX ORDER — LISINOPRIL 2.5 MG/1
1 TABLET ORAL
Qty: 0 | Refills: 0 | DISCHARGE

## 2021-03-11 RX ORDER — LEVOTHYROXINE SODIUM 125 MCG
1 TABLET ORAL
Qty: 0 | Refills: 0 | DISCHARGE

## 2021-03-11 NOTE — CHART NOTE - NSCHARTNOTEFT_GEN_A_CORE
PACU ANESTHESIA ADMISSION NOTE      Procedure: rectal endoscopic US with flex sig  Post op diagnosis:      ____  Intubated  TV:______       Rate: ______      FiO2: ______    _x___  Patent Airway    _x___  Full return of protective reflexes    _x___  Full recovery from anesthesia / back to baseline status    Vitals:  T(C): na  HR: 90  BP: 132/78  RR: 15  SpO2: 99    Mental Status:  _x___ Awake   _x____ Alert   _____ Drowsy   _____ Sedated    Nausea/Vomiting:  _x___  NO       ______Yes,   See Post - Op Orders         Pain Scale (0-10):  __0___    Treatment: _x___ None    ____ See Post - Op/PCA Orders    Post - Operative Fluids:   __x__ Oral   ____ See Post - Op Orders    Plan: Discharge:   _x___Home       _____Floor     _____Critical Care    _____  Other:_________________    Comments:  No anesthesia issues or complications noted.  Discharge when criteria met.

## 2021-03-12 LAB — SURGICAL PATHOLOGY STUDY: SIGNIFICANT CHANGE UP

## 2021-03-17 ENCOUNTER — APPOINTMENT (OUTPATIENT)
Dept: SURGERY | Facility: CLINIC | Age: 51
End: 2021-03-17

## 2021-03-17 DIAGNOSIS — I10 ESSENTIAL (PRIMARY) HYPERTENSION: ICD-10-CM

## 2021-03-17 DIAGNOSIS — C20 MALIGNANT NEOPLASM OF RECTUM: ICD-10-CM

## 2021-03-17 DIAGNOSIS — E03.9 HYPOTHYROIDISM, UNSPECIFIED: ICD-10-CM

## 2021-03-17 DIAGNOSIS — Z87.442 PERSONAL HISTORY OF URINARY CALCULI: ICD-10-CM

## 2021-09-07 NOTE — REASON FOR VISIT
[Follow - Up] : a follow-up visit [Hypothyroidism] : hypothyroidism [Thyroid nodule/ MNG] : thyroid nodule/ MNG [Hyperparathyroidism] : hyperparathyroidism

## 2021-09-09 ENCOUNTER — APPOINTMENT (OUTPATIENT)
Dept: ENDOCRINOLOGY | Facility: CLINIC | Age: 51
End: 2021-09-09
Payer: COMMERCIAL

## 2021-09-09 VITALS
OXYGEN SATURATION: 98 % | TEMPERATURE: 97.2 F | HEART RATE: 81 BPM | DIASTOLIC BLOOD PRESSURE: 80 MMHG | WEIGHT: 285 LBS | SYSTOLIC BLOOD PRESSURE: 130 MMHG | BODY MASS INDEX: 43.19 KG/M2 | HEIGHT: 68 IN

## 2021-09-09 PROCEDURE — 99213 OFFICE O/P EST LOW 20 MIN: CPT

## 2021-09-09 NOTE — ASSESSMENT
[FreeTextEntry1] : Pt diagnosed with rectal ca. The patient has a history of hypothyroidism though currently is clinically euthyroid with no hypothyroid or hyperthyroid signs or symptoms. The patient's TSH (3.0) free T4 and free T3 are in the normal range.Risks of subclinical hyperthyroidism (BMD,cardio etc.) and hypothyroidism ( fatigue , muscle aches, weight gain etc.)  discussed in detail and given clinical euthyroid state after discussion pt. is comfortable with current dose.\par \par Pt. has multinodular goiter. We have  discussed the potential for hyperfunction and need to monitor for changes that could be consistent with neoplasm. Will continue to monitor U/S for stability and function.\par \par Pt. has Impaired fasting glucose  (124=> 108 )and  current HbA1c is up to 6.5 from  6.2 with + microalbumin at 69 mcg/mg Cr.  . We have discussed diet/exercise and risks related to diabetes in great detail.We will attempt to diet and exercise and decide on Metformin next visit. \par \par Lipid levels were reviewed with patient and importance and function of LDL, HDL and triglycerides discussed. Methods to increase HDL (exercise, fish, beans, oat meal, legumes etc.) discussed with pt. in conjunction with measures to decrease LDL and triglycerides  including diet and exercise.LDL/HDL was  148/51 down from 172/50. Current regimen of treatment to continue. Risks of statins were discussed in detail. Statin suggested but wished to hold off so start Vascepa.Trig. were 216. \par \par Pt. has Calcium of  9.5 . The PTH is elevated at 79. The pt. has been counseled to take extra fluids daily. Symptoms of hypercalcemia including polydipsia, polyuria, gastrointestinal pains, kidney stones, ulcers, bone pain , weakness and constipation were  all reviewed. Alternatives for treatment and prognosis were reviewed in detail.\par Pt. has had a low 25 Vit D which is now 26 but elevated 1, 25 Vit D from elevated PTH  .  The importance of normal value and need for Vit D supplements of 1000 IU daily was discussed.\par \par

## 2022-02-17 ENCOUNTER — RX RENEWAL (OUTPATIENT)
Age: 52
End: 2022-02-17

## 2022-03-07 ENCOUNTER — APPOINTMENT (OUTPATIENT)
Dept: ENDOCRINOLOGY | Facility: CLINIC | Age: 52
End: 2022-03-07
Payer: COMMERCIAL

## 2022-03-07 VITALS
HEIGHT: 68 IN | TEMPERATURE: 97.4 F | HEART RATE: 78 BPM | OXYGEN SATURATION: 98 % | SYSTOLIC BLOOD PRESSURE: 130 MMHG | DIASTOLIC BLOOD PRESSURE: 78 MMHG

## 2022-03-07 PROCEDURE — 99213 OFFICE O/P EST LOW 20 MIN: CPT

## 2022-03-07 NOTE — ASSESSMENT
[FreeTextEntry1] : Pt diagnosed with rectal ca. The patient has a history of hypothyroidism though currently is clinically euthyroid with no hypothyroid or hyperthyroid signs or symptoms. The patient's TSH (0.73 from 3.0) free T4 and free T3 are in the normal range.Risks of subclinical hyperthyroidism (BMD,cardio etc.) and hypothyroidism ( fatigue , muscle aches, weight gain etc.)  discussed in detail and given clinical euthyroid state after discussion pt. is comfortable with current dose.\par \par Pt. has multinodular goiter. We have  discussed the potential for hyperfunction and need to monitor for changes that could be consistent with neoplasm. Will continue to monitor U/S for stability and function.\par \par Pt. has Impaired fasting glucose  (124=> 108=> 127  )and  current HbA1c is up to 6.6 with previous 6.5 from  6.2 with + microalbumin at 11 from 69 mcg/mg Cr.  . We have discussed diet/exercise and risks related to diabetes in great detail.We will attempt to diet and exercise and Metformin suggested. \par \par Lipid levels were reviewed with patient and importance and function of LDL, HDL and triglycerides discussed. Methods to increase HDL (exercise, fish, beans, oat meal, legumes etc.) discussed with pt. in conjunction with measures to decrease LDL and triglycerides  including diet and exercise.LDL/HDL was  163/40 from 148/51 and previous 172/50. Current regimen of treatment to continue. Risks of statins were discussed in detail. Statin suggested but wished to hold off so started Vascepa.Trig. were 137 down  from 216. \par \par Pt. has Calcium of  9.3 from  9.5 . The PTH is elevated at 84 from 79. The pt. has been counseled to take extra fluids daily. Symptoms of hypercalcemia including polydipsia, polyuria, gastrointestinal pains, kidney stones, ulcers, bone pain , weakness and constipation were  all reviewed. Alternatives for treatment and prognosis were reviewed in detail.\par Pt. has had a normal 25 Vit D which is now 33 but elevated 1, 25 Vit D  of 78 ( N 18 - 72 ) from elevated PTH  .  \par These are all consistent with eucalcemic hyperparathyroidism. \par

## 2022-03-08 ENCOUNTER — RX RENEWAL (OUTPATIENT)
Age: 52
End: 2022-03-08

## 2022-05-18 ENCOUNTER — NON-APPOINTMENT (OUTPATIENT)
Age: 52
End: 2022-05-18

## 2022-05-23 ENCOUNTER — APPOINTMENT (OUTPATIENT)
Dept: UROLOGY | Facility: CLINIC | Age: 52
End: 2022-05-23
Payer: COMMERCIAL

## 2022-05-23 VITALS — HEIGHT: 67 IN | BODY MASS INDEX: 43.95 KG/M2 | WEIGHT: 280 LBS

## 2022-05-23 DIAGNOSIS — N20.0 CALCULUS OF KIDNEY: ICD-10-CM

## 2022-05-23 PROCEDURE — 99213 OFFICE O/P EST LOW 20 MIN: CPT

## 2022-05-23 NOTE — ASSESSMENT
[FreeTextEntry1] : MAHENDRA HERNANDEZ is a 49 year old female who presents Status post ureteroscopy and laser lithotripsy of an obstructing distal ureteral stone November 7, 2018 did well after surgery and her stent was removed presents for follow-up after recent diagnosis of rectal cancer status postchemotherapy and radiation at [facility] and follow-up imaging demonstrates no evidence of disease however nonobstructing right renal stone.  \par \par Continue dietary stone prevention.  I recommended following up with nephrology given she previously did not tolerate chlorthalidone however she declined at this time\par Recommend KUB to assess the right renal stone as she had prior calcium nephrolithiasis\par Stone prevention diet again discussed and I recommended she discontinue multivitamins if okay with her other doctors\par \par

## 2022-05-23 NOTE — HISTORY OF PRESENT ILLNESS
[None] : None [Lower Back] : lower back [FreeTextEntry1] : MAHENDRA HERNANDEZ is a 49 year old female who presents Status post ureteroscopy and laser lithotripsy of an obstructing distal ureteral stone November 7, 2018 did well after surgery and her stent was removed presents for follow-up after recent diagnosis of rectal cancer status postchemotherapy and radiation at [facility] [place] and follow-up imaging demonstrates no evidence of disease however nonobstructing right renal stone.  \par \par Patient is doing well denies any flank pain hematuria or dysuria.\par Has not had any stone episodes since a few years back.  She states she is taking multivitamins and previous did not tolerate chlorthalidone\par \par CT abdomen pelvis and chest demonstrates unchanged nonobstructive stone within the right kidney this is a report and does not specify the size.  No evidence of developing disease\par \par Previously\par KUB images from October 2020 and there are no stones identified.\par Calcium 9.1\par Cr 0.61\par \par Previously\par Sono-\par Left kidney 13 cm and there are 2 echogenic foci in the left mid kidney measuring 3 mm and 5 mm. Possibly these are small stones. These do not shadow. \par \par Litholink 10/2019 (third) demonstrates hypercalciuria again, dec urine vol borderline 1.75 and inc Na in urine. Similar to previous. Borderline hyperuricosuria. She started chlorthalidone last visit he reports she developed significant cramping of her LE. this med was stopped. \par \par Patient's prior stone analysis is predominantly calcium oxylate.\par Her serum uric acid, serum calcium and serum parathyroid and normal limits.\par \par Records reviewed and prior labs show high normal calcium with normal liver function tests.\par She currently denies any flank pain.\par Denies gross hematuria, dysuria or associated symptoms. \par  [de-identified] : Inspiration/movement [de-identified] : Rest

## 2022-08-01 ENCOUNTER — APPOINTMENT (OUTPATIENT)
Dept: UROLOGY | Facility: CLINIC | Age: 52
End: 2022-08-01

## 2022-12-03 PROBLEM — Z87.442 HISTORY OF NEPHROLITHIASIS: Status: ACTIVE | Noted: 2022-05-23

## 2022-12-05 ENCOUNTER — APPOINTMENT (OUTPATIENT)
Dept: ENDOCRINOLOGY | Facility: CLINIC | Age: 52
End: 2022-12-05

## 2022-12-05 VITALS
SYSTOLIC BLOOD PRESSURE: 130 MMHG | TEMPERATURE: 97.4 F | DIASTOLIC BLOOD PRESSURE: 74 MMHG | WEIGHT: 280 LBS | BODY MASS INDEX: 43.95 KG/M2 | OXYGEN SATURATION: 98 % | HEIGHT: 67 IN | HEART RATE: 83 BPM

## 2022-12-05 DIAGNOSIS — Z87.442 PERSONAL HISTORY OF URINARY CALCULI: ICD-10-CM

## 2022-12-05 PROCEDURE — 99213 OFFICE O/P EST LOW 20 MIN: CPT

## 2022-12-05 RX ORDER — ICOSAPENT ETHYL 1 G/1
1 CAPSULE ORAL
Qty: 360 | Refills: 3 | Status: ACTIVE | COMMUNITY
Start: 2021-01-11 | End: 1900-01-01

## 2022-12-05 NOTE — ASSESSMENT
[FreeTextEntry1] : Pt diagnosed with rectal ca. The patient has a history of hypothyroidism though currently is clinically euthyroid with no hypothyroid or hyperthyroid signs or symptoms. The patient's TSH ,  free T4 and free T3 are in the normal range. Risks of subclinical hyperthyroidism (BMD,cardio etc.) and hypothyroidism ( fatigue , muscle aches, weight gain etc.)  discussed in detail and given clinical euthyroid state after discussion pt. is comfortable with current dose of 175 mcg levothyroxine + liothyronine 5 qd.  ..\par \par Pt. has multinodular goiter. We have  discussed the potential for hyperfunction and need to monitor for changes that could be consistent with neoplasm. Will continue to monitor U/S for stability and function.Current 3/4/2022 is without nodules. \par \par Pt. has Impaired fasting glucose  (124=> 108=> 127=> 116   )and  current HbA1c is 6.4 from  6.6 &  6.5 microalbumin at 8 mcg/mg Cr.  . We have discussed diet/exercise and risks related to diabetes in great detail.We will attempt to diet and exercise and Metformin suggested. \par \par Lipid levels were reviewed with patient and importance and function of LDL, HDL and triglycerides discussed. Methods to increase HDL (exercise, fish, beans, oat meal, legumes etc.) discussed with pt. in conjunction with measures to decrease LDL and triglycerides  including diet and exercise.LDL/HDL was  184/51 from 163/40 & 148/51  Current regimen of treatment to continue. Risks of statins were discussed in detail. Statin suggested but wished to hold off so started Vascepa.Trig. were 158. \par \par Pt. has Calcium of  9.7 from  9.3 . The PTH is elevated at 79 from 84  The pt. has been counseled to take extra fluids daily. Symptoms of hypercalcemia including polydipsia, polyuria, gastrointestinal pains, kidney stones, ulcers, bone pain , weakness and constipation were  all reviewed. Alternatives for treatment and prognosis were reviewed in detail.\par Pt. has had a normal  1,25 Vit D  of 59 from  78 ( N 18 - 72 ) f.  \par These are all consistent with eucalcemic hyperparathyroidism. \par

## 2023-04-06 NOTE — ASU PREOP CHECKLIST - SURGICAL CONSENT
Assessment/Plan     This is a 42 y o  female who presents for OMT follow-up for:  1  Cervical disc disorder with radiculopathy of mid-cervical region        2  Triceps tendonitis        3  Somatic dysfunction of cervical region        4  Somatic dysfunction of thoracic region        5  Somatic dysfunction of upper extremity            Plan:   1  Patient tolerated OMT well for the above problems,  advised patient to drink fluids and can use NSAID for soreness after treatment     2  OMT Follow up in 2 weeks  Subjective     Klaus Pelayo is a 42 y o  female and is here for a OMT follow up  The patient reports cerivcal neck feels about the same  She had much improved of pain from her pectoralis muscle on the left, now feeling some pain in her upper posterior arm  Is the patient taking Pain medication? no  Has the patient completed physical therapy for this condition? yes  Did Patient symptoms improve from last OMT appointment? yes    The following portions of the patient's history were reviewed and updated as appropriate: allergies, current medications, past family history, past medical history, past social history, past surgical history and problem list     Review of Systems  Do you have pain that bothers you in your daily life? yes  Review of Systems   Constitutional: Negative for fatigue and fever  HENT: Negative for congestion  Respiratory: Negative for cough and shortness of breath  Cardiovascular: Negative for chest pain  Gastrointestinal: Negative for abdominal pain  Genitourinary: Negative for difficulty urinating  Musculoskeletal:        As notes in HPI    Neurological: Negative for dizziness and headaches  Objective     OMT Exam   OMT  Performed by: Woodrow Fisher DO  Authorized by: Woodrow Fisher DO   Universal Protocol:  Consent: Verbal consent obtained    Consent given by: patient  Patient understanding: patient states understanding of the procedure being performed  Patient identity confirmed: verbally with patient        Procedure Details:     Region evaluated and treated:  Cervical, Left Extremities and Thoracic    Extremity Information  Extremities: left upper extremity    Thoracic Information  Thoracic Region: T1 - T4 and T5 - T9  Cervical Details:     Examination Method:  Tissue Texture Change, Stability, Laxity, Effusions, Tone and Tenderness, Pain    Severity:  Moderate    Osteopathic Findings:  Hypertonicity of upper trapezius muscle on the right  C6 FRLSL    Treatment Method:  Muscle Energy Treatment, Soft Tissue Treatment and Facilitated Positional Release Treatment    Response:  Improved - The somatic dysfunction is improved but not completely resolved  Thoracic T1 - T4 details:     Examination Method:  Tissue Texture Change, Stability, Laxity, Effusions, Tone and Tenderness, Pain    Severity:  Moderate    Osteopathic Findings:  Bilateral mid trapezius mypertonicity with tenderness on the right     Treatment Method:  Soft Tissue Treatment, Myofascial Release Treatment and Counterstrain Treatment    Response:  Improved    Thoracic T5 - T9 details:     Examination Method:  Tissue Texture Change, Stability, Laxity, Effusions, Tone and Tenderness, Pain    Severity:  Moderate    Osteopathic Findings:  Bilateral T9 paraspinal hypertonicity     Treatment Method:  High Velocity, Low Amplitude Treatment and Soft Tissue Treatment    Response:  Improved - The somatic dysfunction is improved but not completely resolved  Left Upper Extremity details:     Examination Method:  Tissue Texture Change, Stability, Laxity, Effusions, Tone and Tenderness, Pain    Severity:  Moderate    Osteopathic Findings:  Tenderness and hypertonicity of left triceps tendon     Treatment Method:  Muscle Energy Treatment and Facilitated Positional Release Treatment    Response:  Improved - The somatic dysfunction is improved but not completely resolved      Total Regions Treated: 3 done

## 2023-10-13 PROBLEM — R73.01 IMPAIRED FASTING GLUCOSE: Status: ACTIVE | Noted: 2021-01-10

## 2023-10-13 PROBLEM — R82.994 HYPERCALCIURIA: Status: ACTIVE | Noted: 2019-06-06

## 2023-10-14 ENCOUNTER — APPOINTMENT (OUTPATIENT)
Dept: ENDOCRINOLOGY | Facility: CLINIC | Age: 53
End: 2023-10-14
Payer: COMMERCIAL

## 2023-10-14 VITALS
SYSTOLIC BLOOD PRESSURE: 142 MMHG | BODY MASS INDEX: 43.95 KG/M2 | WEIGHT: 280 LBS | DIASTOLIC BLOOD PRESSURE: 88 MMHG | HEART RATE: 85 BPM | HEIGHT: 67 IN | OXYGEN SATURATION: 98 %

## 2023-10-14 DIAGNOSIS — R82.994 HYPERCALCIURIA: ICD-10-CM

## 2023-10-14 DIAGNOSIS — R73.01 IMPAIRED FASTING GLUCOSE: ICD-10-CM

## 2023-10-14 PROCEDURE — 99213 OFFICE O/P EST LOW 20 MIN: CPT

## 2024-05-04 PROBLEM — E04.2 MULTIPLE THYROID NODULES: Status: ACTIVE | Noted: 2021-01-10

## 2024-05-04 PROBLEM — E03.8 HYPOTHYROIDISM DUE TO HASHIMOTO'S THYROIDITIS: Status: ACTIVE | Noted: 2021-01-10

## 2024-05-04 PROBLEM — E21.3 HYPERPARATHYROIDISM, UNSPECIFIED: Status: ACTIVE | Noted: 2022-03-06

## 2024-05-04 PROBLEM — I10 HTN (HYPERTENSION): Status: ACTIVE | Noted: 2018-10-26

## 2024-05-06 ENCOUNTER — APPOINTMENT (OUTPATIENT)
Dept: ENDOCRINOLOGY | Facility: CLINIC | Age: 54
End: 2024-05-06
Payer: COMMERCIAL

## 2024-05-06 VITALS
WEIGHT: 248 LBS | HEIGHT: 67 IN | SYSTOLIC BLOOD PRESSURE: 130 MMHG | BODY MASS INDEX: 38.92 KG/M2 | DIASTOLIC BLOOD PRESSURE: 72 MMHG

## 2024-05-06 DIAGNOSIS — E04.2 NONTOXIC MULTINODULAR GOITER: ICD-10-CM

## 2024-05-06 DIAGNOSIS — E21.3 HYPERPARATHYROIDISM, UNSPECIFIED: ICD-10-CM

## 2024-05-06 DIAGNOSIS — E03.8 OTHER SPECIFIED HYPOTHYROIDISM: ICD-10-CM

## 2024-05-06 DIAGNOSIS — I10 ESSENTIAL (PRIMARY) HYPERTENSION: ICD-10-CM

## 2024-05-06 DIAGNOSIS — E06.3 OTHER SPECIFIED HYPOTHYROIDISM: ICD-10-CM

## 2024-05-06 PROCEDURE — 99214 OFFICE O/P EST MOD 30 MIN: CPT

## 2024-05-06 RX ORDER — LIOTHYRONINE SODIUM 5 UG/1
5 TABLET ORAL
Qty: 90 | Refills: 3 | Status: DISCONTINUED | COMMUNITY
Start: 2022-03-08 | End: 2024-05-06

## 2024-05-06 RX ORDER — LISINOPRIL 10 MG/1
10 TABLET ORAL
Qty: 90 | Refills: 3 | Status: ACTIVE | COMMUNITY
Start: 2022-02-17 | End: 1900-01-01

## 2024-05-06 RX ORDER — LEVOTHYROXINE SODIUM 0.15 MG/1
150 TABLET ORAL DAILY
Qty: 90 | Refills: 3 | Status: ACTIVE | COMMUNITY
Start: 1900-01-01 | End: 1900-01-01

## 2024-05-06 NOTE — ASSESSMENT
[FreeTextEntry1] : Pt diagnosed with rectal ca.  The patient has a history of hypothyroidism though currently is clinically euthyroid with no hypothyroid or hyperthyroid signs or symptoms. The patient's free T4 and free T3 are in the normal range and TSH suppressed. Risks of subclinical hyperthyroidism (BMD, cardio etc.) and hypothyroidism (fatigue, muscle aches, weight gain etc.)  discussed in detail and given clinical euthyroid state after discussion pt. is comfortable with current dose.  175 mcg levothyroxine + liothyronine 5 mcg qd. Pt. has multinodular goiter. We have discussed the potential for hyperfunction and need to monitor for changes that could be consistent with neoplasm. Will continue to monitor U/S for stability and function. Current U/S 9/8/2023 c/w 3/4/2022 is without nodules. Pt. has Type 2 DM (124=> 108=> 127=> 116=>138=> 105) and current HbA1c is 6.0 from 6.6 & 6.4, microalbumin at 7 mcg/mg Cr. We have discussed diet/exercise and risks related to diabetes in great detail. We will attempt to encourage diet and exercise, and Metformin suggested but would prefer to stay with Mounjaro.  Lipid levels were reviewed with patient and importance and function of LDL, HDL and triglycerides discussed. Methods to increase HDL (exercise, fish, beans, oatmeal, legumes etc.) discussed with pt. in conjunction with measures to decrease LDL and triglycerides including diet and exercise. LDL/HDL was 161/44 from 171/47, 184/51, 163/40 & 148/51 Current regimen of treatment to continue. Risks of statins were discussed in detail. Statin suggested but wished to hold off so started Vascepa. Trig. were 137 from 144 & 158. Pt. has Calcium of 9.1 from 9.5, 9.7 & 9.3. The PTH is normal at 66 from 87, 79 & 84 The pt. has been counseled to take extra fluids daily. Symptoms of hypercalcemia including polydipsia, polyuria, gastrointestinal pains, kidney stones, ulcers, bone pain, weakness and constipation were all reviewed. Alternatives for treatment and prognosis were reviewed in detail.1,25 Vit D was elevated at 75 (N 18-72) c/w eucalcemic hyperparathyroidism.

## 2024-05-29 ENCOUNTER — RX RENEWAL (OUTPATIENT)
Age: 54
End: 2024-05-29

## 2024-12-10 PROBLEM — E06.3 HYPOTHYROIDISM DUE TO HASHIMOTO'S THYROIDITIS: Status: ACTIVE | Noted: 2021-01-10

## 2024-12-12 ENCOUNTER — APPOINTMENT (OUTPATIENT)
Dept: ENDOCRINOLOGY | Facility: CLINIC | Age: 54
End: 2024-12-12
Payer: COMMERCIAL

## 2024-12-12 VITALS
SYSTOLIC BLOOD PRESSURE: 130 MMHG | HEART RATE: 82 BPM | HEIGHT: 67 IN | WEIGHT: 243 LBS | BODY MASS INDEX: 38.14 KG/M2 | OXYGEN SATURATION: 98 % | DIASTOLIC BLOOD PRESSURE: 72 MMHG

## 2024-12-12 DIAGNOSIS — E21.3 HYPERPARATHYROIDISM, UNSPECIFIED: ICD-10-CM

## 2024-12-12 DIAGNOSIS — E04.2 NONTOXIC MULTINODULAR GOITER: ICD-10-CM

## 2024-12-12 DIAGNOSIS — R73.01 IMPAIRED FASTING GLUCOSE: ICD-10-CM

## 2024-12-12 DIAGNOSIS — R82.994 HYPERCALCIURIA: ICD-10-CM

## 2024-12-12 DIAGNOSIS — E06.3 AUTOIMMUNE THYROIDITIS: ICD-10-CM

## 2024-12-12 DIAGNOSIS — I10 ESSENTIAL (PRIMARY) HYPERTENSION: ICD-10-CM

## 2024-12-12 PROCEDURE — 99214 OFFICE O/P EST MOD 30 MIN: CPT

## 2024-12-12 RX ORDER — TIRZEPATIDE 7.5 MG/.5ML
7.5 INJECTION, SOLUTION SUBCUTANEOUS
Qty: 12 | Refills: 3 | Status: ACTIVE | COMMUNITY
Start: 2024-12-12 | End: 1900-01-01

## 2024-12-26 NOTE — ASU DISCHARGE PLAN (ADULT/PEDIATRIC). - NOTIFY
12/26/24 0935   Discharge Assessment   Assessment Type Discharge Planning Assessment   Confirmed/corrected address, phone number and insurance Yes   Confirmed Demographics Correct on Facesheet   Source of Information patient;family  (: Christopher Ruiz (Spouse)  [phone] (Mobile))   If unable to respond/provide information was family/caregiver contacted? Yes   Contact Name/Number : Christopher Ruiz (Spouse) [phone] (Mobile)   Communicated PETR with patient/caregiver Date not available/Unable to determine   Reason For Admission 70 y.o. female who PMH includes PAF, CAD, DM , HLD; anxiety, depression, LESLI; Presents to the ED at [facility] on 12/24/2024 with a primary complaint of exertional dyspnea with associated shortness a breath lower extremity swelling and generalized weakness.  Patient has been is at the bedside providing some of the history.  Patient reports she has been feeling bad over the past several days which progressed.  Has been reports he did increase her oxygen to 4 L at home secondary to her shortness a breath which provided little relief.  Chronically patient is on oxygen at 2 L per nasal cannula for her COPD   Facility Arrived From: Private residence ([place])   Do you expect to return to your current living situation? Yes   Do you have help at home or someone to help you manage your care at home? Yes   Who are your caregiver(s) and their phone number(s)? : Christopher Ruiz (Spouse) [phone] (Mobile)   Prior to hospitilization cognitive status: Alert/Oriented   Current cognitive status: Alert/Oriented   Walking or Climbing Stairs Difficulty yes   Walking or Climbing Stairs ambulation difficulty, requires equipment   Mobility Management The patient uses the following DME: 1. Rolling Walker 2. Wheelchair 3. Straight Cane PRN mobility concerns.   Dressing/Bathing Difficulty yes   Dressing/Bathing bathing difficulty, requires equipment   Dressing/Bathing Management The  patient uses the following DME for hygiene (Bathing/Showering) needs: 1. Grab Bars 2. Shower Chair PRN.   Home Accessibility wheelchair accessible  (The patient's home is built on a slab.)   Home Layout Able to live on 1st floor   Equipment Currently Used at Home grab bar;walker, rolling;wheelchair;oxygen;shower chair;cane, straight  (Home O2: [employer]   Readmission within 30 days? No   Patient currently being followed by outpatient case management? No   Do you currently have service(s) that help you manage your care at home? Yes   Name and Contact number of agency [facility] for home health services (Active).   Is the pt/caregiver preference to resume services with current agency Yes   Do you take prescription medications? Yes   Do you have prescription coverage? Yes   Coverage Payor: [employer] MANAGED MEDICARE - [employer] MEDICARE HMO -   Do you have any problems affording any of your prescribed medications? No   Is the patient taking medications as prescribed? yes   Who is going to help you get home at discharge? : Christopher Ruiz (Spouse)  [phone] (Mobile)   How do you get to doctors appointments? family or friend will provide   Are you on dialysis? No   Do you take coumadin? No   Discharge Plan A Home Health  (FOC: The patientis active with HH services through: [facility].)   Discharge Plan B Home Health   DME Needed Upon Discharge  none   Discharge Plan discussed with: Patient   Transition of Care Barriers None   Financial Resource Strain   How hard is it for you to pay for the very basics like food, housing, medical care, and heating? Not very   Housing Stability   In the last 12 months, was there a time when you were not able to pay the mortgage or rent on time? N   At any time in the past 12 months, were you homeless or living in a shelter (including now)? N   Transportation Needs   Has the lack of transportation kept you from medical appointments, meetings, work or from getting things needed for daily living? No    Food Insecurity   Within the past 12 months, you worried that your food would run out before you got the money to buy more. Never true   Within the past 12 months, the food you bought just didn't last and you didn't have money to get more. Never true   Stress   Do you feel stress - tense, restless, nervous, or anxious, or unable to sleep at night because your mind is troubled all the time - these days? Only a littl   Social Isolation   How often do you feel lonely or isolated from those around you?  Rarely   Alcohol Use   Q1: How often do you have a drink containing alcohol? Never   Q2: How many drinks containing alcohol do you have on a typical day when you are drinking? None   Q3: How often do you have six or more drinks on one occasion? Never   Utilities   In the past 12 months has the electric, gas, oil, or water company threatened to shut off services in your home? No   Health Literacy   How often do you need to have someone help you when you read instructions, pamphlets, or other written material from your doctor or pharmacy? Never     : Christopher Laughliner: [phone]  Home Health: [facility] (Active)   Pharmacy: [facility] ([facility]   [facility] O[facility]: [facility]   Fever greater than 101

## 2025-07-16 ENCOUNTER — NON-APPOINTMENT (OUTPATIENT)
Age: 55
End: 2025-07-16

## 2025-07-17 ENCOUNTER — APPOINTMENT (OUTPATIENT)
Dept: ENDOCRINOLOGY | Facility: CLINIC | Age: 55
End: 2025-07-17
Payer: COMMERCIAL

## 2025-07-17 VITALS
WEIGHT: 238 LBS | BODY MASS INDEX: 37.35 KG/M2 | SYSTOLIC BLOOD PRESSURE: 130 MMHG | OXYGEN SATURATION: 98 % | HEIGHT: 67 IN | DIASTOLIC BLOOD PRESSURE: 70 MMHG | HEART RATE: 80 BPM

## 2025-07-17 PROCEDURE — 99214 OFFICE O/P EST MOD 30 MIN: CPT
